# Patient Record
Sex: MALE | URBAN - METROPOLITAN AREA
[De-identification: names, ages, dates, MRNs, and addresses within clinical notes are randomized per-mention and may not be internally consistent; named-entity substitution may affect disease eponyms.]

---

## 2022-10-06 ENCOUNTER — OFFICE VISIT (OUTPATIENT)
Dept: URBAN - METROPOLITAN AREA CLINIC 3 | Facility: CLINIC | Age: 72
End: 2022-10-06
Payer: MEDICARE

## 2022-10-06 DIAGNOSIS — E11.3413 DIABETES MELLITUS TYPE 2 WITH SEVERE NON-PROLIFERATIVE RETINOPATHY WITH MACULAR EDEMA, BILATERAL: ICD-10-CM

## 2022-10-06 DIAGNOSIS — H43.11 VITREOUS HEMORRHAGE, RIGHT EYE: ICD-10-CM

## 2022-10-06 DIAGNOSIS — H25.813 COMBINED FORMS OF AGE-RELATED CATARACT, BILATERAL: Primary | ICD-10-CM

## 2022-10-06 DIAGNOSIS — H35.61 RETINAL HEMORRHAGE, RIGHT EYE: ICD-10-CM

## 2022-10-06 DIAGNOSIS — H43.813 VITREOUS DEGENERATION, BILATERAL: ICD-10-CM

## 2022-10-06 PROCEDURE — 99204 OFFICE O/P NEW MOD 45 MIN: CPT | Performed by: OPTOMETRIST

## 2022-10-06 PROCEDURE — 92134 CPTRZ OPH DX IMG PST SGM RTA: CPT | Performed by: OPTOMETRIST

## 2022-10-06 ASSESSMENT — INTRAOCULAR PRESSURE
OS: 13
OD: 11

## 2022-10-06 ASSESSMENT — VISUAL ACUITY: OD: 20/60

## 2022-10-06 NOTE — IMPRESSION/PLAN
Impression: Diabetes mellitus Type 2 with severe non-proliferative retinopathy with macular edema, bilateral: T96.3700. Plan:  MOCT confirms csme ou and erm os. Macular thickening present. No Edema present. No Hemorrhages present. Discussed disease process with patient. Recommended AREDS2 vitamins and Amsler grid monitoring daily. Discussed avoiding cigarette smoke, including second-hand smoke. Refer to retinal specialist Fundus photo taken. PRP OS?   Refer to retinal specialist.

## 2022-10-06 NOTE — IMPRESSION/PLAN
Impression: Vitreous hemorrhage, right eye: H43.11. Plan: Monitor. Patient educated about signs and symptoms of retinal detachment and instructed to return to clinic ASAP.